# Patient Record
Sex: MALE | Race: OTHER | Employment: UNEMPLOYED | URBAN - METROPOLITAN AREA
[De-identification: names, ages, dates, MRNs, and addresses within clinical notes are randomized per-mention and may not be internally consistent; named-entity substitution may affect disease eponyms.]

---

## 2017-10-19 ENCOUNTER — HOSPITAL ENCOUNTER (EMERGENCY)
Age: 27
Discharge: HOME | End: 2017-10-19
Attending: EMERGENCY MEDICINE
Payer: SUBSIDIZED

## 2017-10-19 ENCOUNTER — APPOINTMENT (OUTPATIENT)
Dept: GENERAL RADIOLOGY | Age: 27
End: 2017-10-19
Attending: PHYSICIAN ASSISTANT
Payer: SUBSIDIZED

## 2017-10-19 VITALS
SYSTOLIC BLOOD PRESSURE: 129 MMHG | HEART RATE: 70 BPM | TEMPERATURE: 98.3 F | OXYGEN SATURATION: 99 % | DIASTOLIC BLOOD PRESSURE: 87 MMHG | WEIGHT: 283.9 LBS | RESPIRATION RATE: 18 BRPM

## 2017-10-19 DIAGNOSIS — S39.012A STRAIN OF LUMBAR REGION, INITIAL ENCOUNTER: Primary | ICD-10-CM

## 2017-10-19 PROCEDURE — 72100 X-RAY EXAM L-S SPINE 2/3 VWS: CPT

## 2017-10-19 PROCEDURE — 72072 X-RAY EXAM THORAC SPINE 3VWS: CPT

## 2017-10-19 PROCEDURE — 99283 EMERGENCY DEPT VISIT LOW MDM: CPT

## 2017-10-19 PROCEDURE — 74011250637 HC RX REV CODE- 250/637: Performed by: PHYSICIAN ASSISTANT

## 2017-10-19 RX ORDER — NAPROXEN 250 MG/1
500 TABLET ORAL
Status: COMPLETED | OUTPATIENT
Start: 2017-10-19 | End: 2017-10-19

## 2017-10-19 RX ORDER — METHOCARBAMOL 750 MG/1
750 TABLET, FILM COATED ORAL 4 TIMES DAILY
Qty: 15 TAB | Refills: 0 | Status: SHIPPED | OUTPATIENT
Start: 2017-10-19

## 2017-10-19 RX ORDER — NAPROXEN 500 MG/1
500 TABLET ORAL 2 TIMES DAILY WITH MEALS
Qty: 15 TAB | Refills: 0 | Status: SHIPPED | OUTPATIENT
Start: 2017-10-19

## 2017-10-19 RX ORDER — CYCLOBENZAPRINE HCL 10 MG
10 TABLET ORAL
Status: COMPLETED | OUTPATIENT
Start: 2017-10-19 | End: 2017-10-19

## 2017-10-19 RX ADMIN — CYCLOBENZAPRINE HYDROCHLORIDE 10 MG: 10 TABLET, FILM COATED ORAL at 22:07

## 2017-10-19 RX ADMIN — NAPROXEN 500 MG: 250 TABLET ORAL at 22:07

## 2017-10-20 NOTE — ED PROVIDER NOTES
HPI Comments: 27 yo  male with complaint of a two day hx of 10/10, left sided lower back pain, sharp, non radiating, that began after bending over to pick something up, exacerbated with movement and coughing. Minimal improvement with Tylenol. No known hx of back issues. No fever, extremity numbness, extremity weakness, chest pain, SOB, abdominal pain, nausea, vomiting, diarrhea, bowel incontinence, urinary incontinence, dysuria, urgency, hematuria or saddle paresthesia. Patient is a 28 y.o. male presenting with back pain. The history is provided by the patient. Back Pain    Pertinent negatives include no chest pain, no fever, no numbness, no headaches, no abdominal pain, no dysuria and no weakness. History reviewed. No pertinent past medical history. History reviewed. No pertinent surgical history. History reviewed. No pertinent family history. Social History     Social History    Marital status: SINGLE     Spouse name: N/A    Number of children: N/A    Years of education: N/A     Occupational History    Not on file. Social History Main Topics    Smoking status: Never Smoker    Smokeless tobacco: Not on file    Alcohol use Yes      Comment: socially    Drug use: Not on file    Sexual activity: Not on file     Other Topics Concern    Not on file     Social History Narrative    No narrative on file         ALLERGIES: Review of patient's allergies indicates no known allergies. Review of Systems   Constitutional: Negative. Negative for chills and fever. HENT: Negative for congestion, ear pain, rhinorrhea, sore throat and voice change. Eyes: Negative. Negative for photophobia, pain and itching. Respiratory: Negative for cough, chest tightness and shortness of breath. Cardiovascular: Negative for chest pain and palpitations. Gastrointestinal: Negative for abdominal distention, abdominal pain, constipation, diarrhea and vomiting.    Genitourinary: Negative for difficulty urinating, dysuria, frequency and urgency. Musculoskeletal: Positive for back pain. Negative for joint swelling and neck stiffness. Neurological: Negative for weakness, numbness and headaches. Psychiatric/Behavioral: Negative for confusion and decreased concentration. All other systems reviewed and are negative. Vitals:    10/19/17 2130   BP: (!) 150/92   Pulse: 75   Resp: 18   Temp: 97.6 °F (36.4 °C)   SpO2: 98%   Weight: 128.8 kg (283 lb 14.4 oz)            Physical Exam   Constitutional: He is oriented to person, place, and time. He appears well-developed and well-nourished. No distress. Well appearing  male in NAD   HENT:   Head: Normocephalic and atraumatic. Right Ear: External ear normal.   Left Ear: External ear normal.   Nose: Nose normal.   Mouth/Throat: Oropharynx is clear and moist. No oropharyngeal exudate. Eyes: Conjunctivae and EOM are normal. Pupils are equal, round, and reactive to light. Right eye exhibits no discharge. Left eye exhibits no discharge. Neck: Normal range of motion. Neck supple. Cardiovascular: Normal rate, regular rhythm and normal heart sounds. Pulmonary/Chest: Effort normal and breath sounds normal. He has no wheezes. He has no rales. Abdominal: Soft. Bowel sounds are normal. He exhibits no distension. There is no tenderness. There is no guarding. Musculoskeletal: Normal range of motion. Lumbar back: He exhibits tenderness (left paraspinal) and pain. He exhibits normal range of motion, no bony tenderness, no swelling, no edema, no deformity and no spasm. Lymphadenopathy:     He has no cervical adenopathy. Neurological: He is alert and oriented to person, place, and time. No cranial nerve deficit. Skin: Skin is warm and dry. He is not diaphoretic. Psychiatric: He has a normal mood and affect. His behavior is normal.   Nursing note and vitals reviewed.        MDM  Number of Diagnoses or Management Options  Diagnosis management comments: 27 yo  male with complaint of left sided lower back pain x two days after bending. TTP through musculature. No e/o cord compression or more concerning etiology for pain. Plan  Xray l spine  Analgesia  Reassess. Jovany Flores         Amount and/or Complexity of Data Reviewed  Tests in the radiology section of CPT®: ordered and reviewed  Independent visualization of images, tracings, or specimens: yes      ED Course       Procedures         Progress note    Imaging reviewed. Jovany Flores    Patient's results have been reviewed with them. Patient and/or family have verbally conveyed their understanding and agreement of the patient's signs, symptoms, diagnosis, treatment and prognosis and additionally agree to follow up as recommended or return to the Emergency Room should their condition change prior to follow-up. Discharge instructions have also been provided to the patient with some educational information regarding their diagnosis as well a list of reasons why they would want to return to the ER prior to their follow-up appointment should their condition change. Jovany Flores    A/P  Lumbar strain: Apply moist heat in 20 minute intervals. Naprosyn twice daily for pain. Robaxin every 6 hrs as needed for pain. Follow-up with regular doctor. Return for any new or worsening.  Jovany Paul

## 2017-10-20 NOTE — DISCHARGE INSTRUCTIONS
Distensión de la espalda: Instrucciones de cuidado - [ Back Strain: Care Instructions ]  Instrucciones de cuidado    La distensión de la espalda ocurre cuando usted estira demasiado, o fuerza, un músculo de la espalda. Usted puede lesionarse la espalda en un accidente o cuando hace ejercicio o levanta algo. La mayoría de los norman de espalda mejoran con reposo y [place]. Usted puede cuidarse en el hogar para ayudar a que gimenez espalda sane. La atención de seguimiento es tiny parte clave de gimenez tratamiento y seguridad. Asegúrese de hacer y acudir a todas las citas, y llame a gimenez médico si está teniendo problemas. También es tiny buena idea saber los resultados de los exámenes y mantener tiny lista de los medicamentos que bernard. ¿Cómo puede cuidarse en el hogar? · Trate de permanecer tan activo james pueda, brigette deje de hacer o reduzca cualquier actividad que le produzca dolor. · Colóquese hielo o tiny compresa fría en el músculo adolorido de 10 a 20 minutos cada vez para detener la hinchazón. Trate de hacerlo cada 1 o 2 horas fiona 3 días (cuando esté despierto) o hasta que la hinchazón baje. Póngase un paño flores entre el hielo y la piel. · Después de 2 o 3 días, coloque tiny almohadilla térmica ajustada a baja temperatura o un paño tibio sobre la espalda. Algunos médicos sugieren que se alterne entre tratamientos calientes y fríos. · [employer] analgésicos (medicamentos para el dolor) exactamente según las indicaciones. ¨ Si el médico le recetó un analgésico, tómelo según las indicaciones. ¨ Si no está tomando un analgésico recetado, pregúntele a gimenez médico si puede norberto alvin de [employer]. · Trate de dormir de lado con tiny almohada entre las piernas. O, colóquese tiny almohada debajo de las rodillas cuando se acueste [place] Estas medidas pueden aliviar el dolor en la parte baja de la espalda. · Burnie Loron a poco a gimenez nivel habitual de actividades. ¿Cuándo debe pedir ayuda?   Llame al 911 en cualquier momento que considere que necesita atención de [place]. Por ejemplo, llame si:  · Es completamente incapaz de  tiny pierna. Llame a gimenez médico ahora mismo o busque atención médica inmediata si:  · Tiene síntomas nuevos o peores en las piernas, el abdomen o las nalgas (glúteos). Los síntomas pueden incluir:  ¨ Entumecimiento u hormigueo. ¨ Debilidad. ¨ Dolor. · Pierde el control de la vejiga o del intestino. Preste especial atención a los cambios en gimenez artem y asegúrese de comunicarse con gimenez médico si no mejora james se esperaba. ¿Dónde puede encontrar más información en inglés? Asim Russo a http://agata-alicia.info/. Glorya Sacks G505 en la búsqueda para aprender más acerca de \"Distensión de la espalda: Instrucciones de cuidado - [ Back Strain: Care Instructions ]. \"  Revisado: 21 marzo, 2017  Versión del contenido: 11.3  © 7775-8540 [employer]. Las instrucciones de cuidado fueron adaptadas bajo licencia por [employer] (which disclaims liability or warranty for this information). Si usted tiene [place] afección médica o sobre estas instrucciones, siempre pregunte a gimenez profesional de artem. [employer] niega toda garantía o responsabilidad por gimenez uso de esta información.